# Patient Record
Sex: MALE | Race: WHITE | Employment: FULL TIME | URBAN - METROPOLITAN AREA
[De-identification: names, ages, dates, MRNs, and addresses within clinical notes are randomized per-mention and may not be internally consistent; named-entity substitution may affect disease eponyms.]

---

## 2024-07-29 ENCOUNTER — CLINICAL DOCUMENTATION (OUTPATIENT)
Facility: HOSPITAL | Age: 64
End: 2024-07-29

## 2024-07-29 ENCOUNTER — HOSPITAL ENCOUNTER (OUTPATIENT)
Facility: HOSPITAL | Age: 64
Discharge: HOME | End: 2024-08-01

## 2024-07-29 VITALS
BODY MASS INDEX: 23.62 KG/M2 | HEIGHT: 75 IN | DIASTOLIC BLOOD PRESSURE: 75 MMHG | SYSTOLIC BLOOD PRESSURE: 145 MMHG | WEIGHT: 190 LBS | HEART RATE: 87 BPM

## 2024-07-29 DIAGNOSIS — C61 PROSTATE CANCER (HCC): Primary | ICD-10-CM

## 2024-07-29 RX ORDER — TADALAFIL 5 MG/1
TABLET ORAL
COMMUNITY

## 2024-07-29 RX ORDER — ATORVASTATIN CALCIUM 10 MG/1
TABLET, FILM COATED ORAL
COMMUNITY
Start: 2017-12-11

## 2024-07-29 ASSESSMENT — PAIN SCALES - GENERAL: PAINLEVEL_OUTOF10: 0

## 2024-07-29 NOTE — PROGRESS NOTES
[facility]  [facility]    Radiation Oncology Consultation  Encounter Date: 07/29/24    Negro Armendariz  [id number]  1960     Diagnosis   C61: [id number], iPSA 6.4, GG2 (+1/14) favorable intermediate risk prostate cancer    AJCC Staging has been reviewed.  History of Present Illness   Mr. Armendariz is a 63 y.o. male seen in consultation at the request of Dr. Rudd to assess the role of radiation for his prostate cancer.    His oncologic history is detailed below:  01/17/2024: PSA 6.4  04/01/2024: MRI prostate showed a 85cc gland with PIRADS 3 lesions in the left anterior TZ at the mid-gland and right lateral PZ at the mid gland without EPE, SVI, pelvic lymphadenopathy, or aggressive osseous lesions.  05/28/2024: Fusion biopsy showed a 87cc gland with GS 3+4=7 disease in left TZ targeted core, benign second target. Standard TRUS biopsy showed benign disease, total 3/4 positive cores with up to 5% core involvement. Decipher ordered but unable to be completed due to insufficient tissue  06/13/2024: PSA 11.04    Symptomatically, he has mild urinary symptoms, though does take daily tadalafil for ED. From a performance status standpoint, he is able to complete her ADLs without issue. Mr. Armendariz denies a history of inflammatory bowel disease, scleroderma or other collagen vascular diseases, pacemaker/AICD, or history of prior irradiation. Last colonoscopy was earlier this year. His brother and father were treated for prostate cancer.    IPSS:  Symptom Score   0 = Not at all   Incomplete Emptying 1   1 = Less than 1 in 5   Frequency 1   2 = Less than half the time    Intermittency 0   3 = About half the time   Urgency 1   4 =  More than half the time   Weak Stream 0   5 =  Almost always   Straining 0         Nocturia 0      Total 3         Quality of Life 2 (mostly satisfied)      0-7 Mildly symptomatic  8-19 moderately symptomatic  20-35 severely symptomatic    KEATON

## 2024-07-29 NOTE — PROGRESS NOTES
[facility]N Distress Thermometer    Date Screening Completed: 7/29/24    Screening Declined:  [] Yes    Number that best describes how much distress you've experienced in the past week, including today?  0 [] - No distress 1 []      2 [x]      3 []      4 []       5 []       6 []      7 []      8 []      9 []       10 [] - Extreme distress    PROBLEM LIST  Have you had concerns about any of the items below in the past week, including today?      Physical Concerns Practical Concerns   [] Pain [] Taking care of myself    [] Sleep [] Taking care of others    [] Fatigue [] Work   [] Tobacco use  [] School   [] Substance use  [] Housing   [] Memory or concentration [] Finances   [] Sexual health [] Insurance   [] Changes in eating  [] Transportation   [] Loss or change of physical abilities  []     [] Having enough food   Emotional Concerns [] Access to medicine   [] Worry or anxiety [] Treatment decisions   [] Sadness or depression    [] Loss of interest or enjoyment  Spiritual or Zoroastrian Concerns   [] Grief or loss  [] Sense of meaning or purpose   [] Fear [] Changes in mandy or beliefs   [] Loneliness  [] Death, dying, or afterlife   [] Anger [] Conflict between beliefs and cancer treatments    [] Changes in appearance [] Relationship with the sacred   [] Feelings of worthlessness or being a burden [] Ritual or dietary needs        Social Concerns     [] Relationship with spouse or partner     [] Relationship with children    [] Relationship with family members     [] Relationship with friends or coworkers     [] Communication with health care team     [] Ability to have children     [] Prejudice or discrimination        Other Concerns:     Patient received resource information and education:  [] Yes  [x] No